# Patient Record
(demographics unavailable — no encounter records)

---

## 2025-04-01 NOTE — DATA REVIEWED
[FreeTextEntry1] : 4/25  A1c 7.3% 7/24:  A1c 7.3%,  trig 159, HDL 35, LDL 83, TSH 2.56 3/24  A1c 7.1% 8/23  A1c 6.9% tot chol 134, HDL 29, trig 153, LDL 74, TSH 2.90 6/23 A1c 7.2%  tot chol 140, trig 265, HDL 33, LDL 64, urine alb/cr 13, TSH 1.87, B12 > 2000 5/22  A1c 6.6%, tot chol 181, trig 245, HDL 38, LDL 94, urine alb/cr 34, B12 372 9/21: A1c 6.9%,  tot chol 163, trig 165, HDL 33, LDL 97, urine alb/cr 18 8/21: A1c 8.0%, tot chol 187, trig 750, HDL 33, LDL 56 1/21: A1c 6.9%, tot chol 151, trig 274, HDL 36, LDL 60 5/20: A1c 7.0%, tot chol 234, trig 418, HDL 41, TSH 2.910, urine alb/cr 15.4 12/19: A1c 7.0%, Cr 0.81,  6/19: A1c 6.6%, Cr 0.79, , tot chol 155, trig 149, HDL 41, LDL 84, urine microalbumin < 1.2, TSH 2.53 12/18: A1c 6.6%, Cr 0.88 6/18: A1c 7.7%, tot chol 155, trig 162, HDL 43, LDL 80, urine microalb/cr 10, TSH 1.89 5/17: A1c 7.7%, tot chol 182, trig 175, HDL 42, , urine microalb/cr 6.6, TSH 2.06 7/16: A1c 7.3%, tot chol 198, trig 241, HDL 43, , TSH 2.67, urine microalb/cr 21 8/15: A1c 7.3%, tot chol 168, trig 185, HDL 36, LDL 95, LFts normal 5/15: A1c 8.2%, tot chol 199, HDL 40, trig 408, LDL 40, urine microalb < 1.2, Cr 0.76

## 2025-04-01 NOTE — HISTORY OF PRESENT ILLNESS
[FreeTextEntry1] : no new health issues since last visit (1 year ago) He tests sugar at home very infrequently, once every few weeks.   Numbers range 110-150, different times of the day. weight is stable no  SOB, chest pain, or neuropathy symptoms  seeing ophtho  every 2-3 months, for dry eye Not exercising (beyond walking) labs reviewed from his phone, 7/24:  A1c 7.3%,  trig 159, HDL 35, LDL 83, TSH 2.56 A1c today is 7.3%,  Meds: Trulicity 3mg/week,  Farxiga 10 mg, metformin ER 500mg, 2 tab bid rosuvastatin 10mg, fenofibrate 134mg  lisinopril 5mg [place] drops for dry eye previous meds: Invokana (changed to Farxiga)

## 2025-04-01 NOTE — ASSESSMENT
[FreeTextEntry1] : Diabetes with microalbuminuria, on ACEI and SGLT2.  Hyperchol with hypertrigs.    A1c increasing. I recommended changing Trulicity to Mounjaro 5mg/week.  He should test sugars more regularly after this switch, to see if sugars are in target range. Continue  metformin ER 500mg, 2 tab BID, and Farxiga 10mg/day. Continue fenofibrate 134mg; decreasing dose in the past resulted in higher trigs. Continue rosuvastatin 10mg. due for urine albumin RTO 6-12  months.  Referral given to see Dr Monet Garduno in [place], which is closer to his home, so he can have more regular follow up.

## 2025-04-01 NOTE — PHYSICAL EXAM
[Alert] : alert [Healthy Appearance] : healthy appearance [No Proptosis] : no proptosis [No Lid Lag] : no lid lag [Normal Hearing] : hearing was normal [No LAD] : no lymphadenopathy [Thyroid Not Enlarged] : the thyroid was not enlarged [Clear to Auscultation] : lungs were clear to auscultation bilaterally [Normal S1, S2] : normal S1 and S2 [Regular Rhythm] : with a regular rhythm [No Edema] : no peripheral edema [Pedal Pulses Normal] : the pedal pulses are present [Normal Sensation on Monofilament Testing] : normal sensation on monofilament testing of lower extremities [Normal Affect] : the affect was normal [Normal Mood] : the mood was normal [Acanthosis Nigricans] : no acanthosis nigricans [Foot Ulcers] : no foot ulcers [de-identified] : reg tachy [de-identified] : no onychomycoses, (+) hair growth on toes.